# Patient Record
Sex: MALE | Race: WHITE | NOT HISPANIC OR LATINO | Employment: OTHER | URBAN - METROPOLITAN AREA
[De-identification: names, ages, dates, MRNs, and addresses within clinical notes are randomized per-mention and may not be internally consistent; named-entity substitution may affect disease eponyms.]

---

## 2017-05-12 ENCOUNTER — HOSPITAL ENCOUNTER (INPATIENT)
Facility: HOSPITAL | Age: 72
LOS: 1 days | Discharge: HOME | DRG: 086 | End: 2017-05-14
Attending: SURGERY | Admitting: SURGERY
Payer: MEDICARE

## 2017-05-12 ENCOUNTER — APPOINTMENT (EMERGENCY)
Dept: RADIOLOGY | Facility: HOSPITAL | Age: 72
DRG: 086 | End: 2017-05-12
Payer: MEDICARE

## 2017-05-12 DIAGNOSIS — S06.5X9A SUBDURAL HEMATOMA (HCC): Primary | ICD-10-CM

## 2017-05-12 LAB
BASE EXCESS BLDA CALC-SCNC: -5 MMOL/L (ref -2–3)
CA-I BLD-SCNC: 0.75 MMOL/L (ref 1.12–1.32)
GLUCOSE SERPL-MCNC: 145 MG/DL (ref 65–140)
HCO3 BLDA-SCNC: 16.4 MMOL/L (ref 24–30)
HCT VFR BLD CALC: 39 % (ref 36.5–49.3)
HGB BLDA-MCNC: 13.3 G/DL (ref 12–17)
PCO2 BLD: 17 MMOL/L (ref 21–32)
PCO2 BLD: 21.2 MM HG (ref 42–50)
PH BLD: 7.5 [PH] (ref 7.3–7.4)
PO2 BLD: 35 MM HG (ref 35–45)
POTASSIUM BLD-SCNC: 3.9 MMOL/L (ref 3.5–5.3)
SAO2 % BLD FROM PO2: 74 % (ref 95–98)
SODIUM BLD-SCNC: 130 MMOL/L (ref 136–145)
SPECIMEN SOURCE: ABNORMAL

## 2017-05-12 PROCEDURE — 82330 ASSAY OF CALCIUM: CPT

## 2017-05-12 PROCEDURE — 82947 ASSAY GLUCOSE BLOOD QUANT: CPT

## 2017-05-12 PROCEDURE — 70450 CT HEAD/BRAIN W/O DYE: CPT

## 2017-05-12 PROCEDURE — 80320 DRUG SCREEN QUANTALCOHOLS: CPT | Performed by: SURGERY

## 2017-05-12 PROCEDURE — 84132 ASSAY OF SERUM POTASSIUM: CPT

## 2017-05-12 PROCEDURE — 70486 CT MAXILLOFACIAL W/O DYE: CPT

## 2017-05-12 PROCEDURE — 84295 ASSAY OF SERUM SODIUM: CPT

## 2017-05-12 PROCEDURE — 85014 HEMATOCRIT: CPT

## 2017-05-12 PROCEDURE — 82803 BLOOD GASES ANY COMBINATION: CPT

## 2017-05-12 PROCEDURE — 71010 HB CHEST X-RAY 1 VIEW FRONTAL: CPT

## 2017-05-12 PROCEDURE — 72125 CT NECK SPINE W/O DYE: CPT

## 2017-05-12 PROCEDURE — 82075 ASSAY OF BREATH ETHANOL: CPT | Performed by: EMERGENCY MEDICINE

## 2017-05-12 PROCEDURE — 36415 COLL VENOUS BLD VENIPUNCTURE: CPT | Performed by: EMERGENCY MEDICINE

## 2017-05-13 ENCOUNTER — APPOINTMENT (INPATIENT)
Dept: RADIOLOGY | Facility: HOSPITAL | Age: 72
DRG: 086 | End: 2017-05-13
Payer: MEDICARE

## 2017-05-13 PROBLEM — D69.6 THROMBOCYTOPENIA (HCC): Status: ACTIVE | Noted: 2017-05-13

## 2017-05-13 PROBLEM — F10.929 ALCOHOL INTOXICATION (HCC): Status: ACTIVE | Noted: 2017-05-13

## 2017-05-13 PROBLEM — E87.6 HYPOKALEMIA: Status: ACTIVE | Noted: 2017-05-13

## 2017-05-13 PROBLEM — S06.5X9A SUBDURAL HEMATOMA (HCC): Status: ACTIVE | Noted: 2017-05-13

## 2017-05-13 PROBLEM — S09.93XA FACIAL TRAUMA: Status: ACTIVE | Noted: 2017-05-13

## 2017-05-13 LAB
ABO GROUP BLD BPU: NORMAL
ABO GROUP BLD BPU: NORMAL
ABO GROUP BLD: NORMAL
ALBUMIN SERPL BCP-MCNC: 3.3 G/DL (ref 3.5–5)
ALP SERPL-CCNC: 133 U/L (ref 46–116)
ALT SERPL W P-5'-P-CCNC: 55 U/L (ref 12–78)
ANION GAP SERPL CALCULATED.3IONS-SCNC: 15 MMOL/L (ref 4–13)
APTT PPP: 32 SECONDS (ref 23–35)
AST SERPL W P-5'-P-CCNC: 102 U/L (ref 5–45)
BASOPHILS # BLD AUTO: 0.02 THOUSANDS/ΜL (ref 0–0.1)
BASOPHILS # BLD AUTO: 0.02 THOUSANDS/ΜL (ref 0–0.1)
BASOPHILS NFR BLD AUTO: 0 % (ref 0–1)
BASOPHILS NFR BLD AUTO: 0 % (ref 0–1)
BILIRUB DIRECT SERPL-MCNC: 0.6 MG/DL (ref 0–0.2)
BILIRUB SERPL-MCNC: 1.06 MG/DL (ref 0.2–1)
BLD GP AB SCN SERPL QL: NEGATIVE
BPU ID: NORMAL
BPU ID: NORMAL
BUN SERPL-MCNC: 13 MG/DL (ref 5–25)
CALCIUM SERPL-MCNC: 8.5 MG/DL (ref 8.3–10.1)
CHLORIDE SERPL-SCNC: 95 MMOL/L (ref 100–108)
CO2 SERPL-SCNC: 22 MMOL/L (ref 21–32)
CREAT SERPL-MCNC: 1.05 MG/DL (ref 0.6–1.3)
EOSINOPHIL # BLD AUTO: 0 THOUSAND/ΜL (ref 0–0.61)
EOSINOPHIL # BLD AUTO: 0.02 THOUSAND/ΜL (ref 0–0.61)
EOSINOPHIL NFR BLD AUTO: 0 % (ref 0–6)
EOSINOPHIL NFR BLD AUTO: 0 % (ref 0–6)
ERYTHROCYTE [DISTWIDTH] IN BLOOD BY AUTOMATED COUNT: 14.3 % (ref 11.6–15.1)
ERYTHROCYTE [DISTWIDTH] IN BLOOD BY AUTOMATED COUNT: 14.3 % (ref 11.6–15.1)
ETHANOL SERPL-MCNC: 399 MG/DL (ref 0–3)
GFR SERPL CREATININE-BSD FRML MDRD: >60 ML/MIN/1.73SQ M
GLUCOSE SERPL-MCNC: 124 MG/DL (ref 65–140)
HCT VFR BLD AUTO: 30.5 % (ref 36.5–49.3)
HCT VFR BLD AUTO: 35.9 % (ref 36.5–49.3)
HGB BLD-MCNC: 10.5 G/DL (ref 12–17)
HGB BLD-MCNC: 12.3 G/DL (ref 12–17)
INR PPP: 1.38 (ref 0.86–1.16)
LYMPHOCYTES # BLD AUTO: 0.77 THOUSANDS/ΜL (ref 0.6–4.47)
LYMPHOCYTES # BLD AUTO: 0.87 THOUSANDS/ΜL (ref 0.6–4.47)
LYMPHOCYTES NFR BLD AUTO: 13 % (ref 14–44)
LYMPHOCYTES NFR BLD AUTO: 14 % (ref 14–44)
MCH RBC QN AUTO: 30.8 PG (ref 26.8–34.3)
MCH RBC QN AUTO: 30.9 PG (ref 26.8–34.3)
MCHC RBC AUTO-ENTMCNC: 34.3 G/DL (ref 31.4–37.4)
MCHC RBC AUTO-ENTMCNC: 34.4 G/DL (ref 31.4–37.4)
MCV RBC AUTO: 89 FL (ref 82–98)
MCV RBC AUTO: 90 FL (ref 82–98)
MONOCYTES # BLD AUTO: 0.32 THOUSAND/ΜL (ref 0.17–1.22)
MONOCYTES # BLD AUTO: 0.36 THOUSAND/ΜL (ref 0.17–1.22)
MONOCYTES NFR BLD AUTO: 5 % (ref 4–12)
MONOCYTES NFR BLD AUTO: 6 % (ref 4–12)
NEUTROPHILS # BLD AUTO: 4.49 THOUSANDS/ΜL (ref 1.85–7.62)
NEUTROPHILS # BLD AUTO: 5.58 THOUSANDS/ΜL (ref 1.85–7.62)
NEUTS SEG NFR BLD AUTO: 80 % (ref 43–75)
NEUTS SEG NFR BLD AUTO: 82 % (ref 43–75)
NRBC BLD AUTO-RTO: 0 /100 WBCS
NRBC BLD AUTO-RTO: 0 /100 WBCS
PLATELET # BLD AUTO: 18 THOUSANDS/UL (ref 149–390)
PLATELET # BLD AUTO: 46 THOUSANDS/UL (ref 149–390)
PLATELET # BLD AUTO: 61 THOUSANDS/UL (ref 149–390)
PMV BLD AUTO: 9.2 FL (ref 8.9–12.7)
PMV BLD AUTO: 9.5 FL (ref 8.9–12.7)
PMV BLD AUTO: 9.5 FL (ref 8.9–12.7)
POTASSIUM SERPL-SCNC: 3.6 MMOL/L (ref 3.5–5.3)
PROT SERPL-MCNC: 7.4 G/DL (ref 6.4–8.2)
PROTHROMBIN TIME: 17 SECONDS (ref 12.1–14.4)
RBC # BLD AUTO: 3.41 MILLION/UL (ref 3.88–5.62)
RBC # BLD AUTO: 3.98 MILLION/UL (ref 3.88–5.62)
RH BLD: POSITIVE
SODIUM SERPL-SCNC: 132 MMOL/L (ref 136–145)
SPECIMEN EXPIRATION DATE: NORMAL
UNIT DISPENSE STATUS: NORMAL
UNIT DISPENSE STATUS: NORMAL
UNIT PRODUCT CODE: NORMAL
UNIT PRODUCT CODE: NORMAL
UNIT RH: NORMAL
UNIT RH: NORMAL
WBC # BLD AUTO: 5.65 THOUSAND/UL (ref 4.31–10.16)
WBC # BLD AUTO: 6.85 THOUSAND/UL (ref 4.31–10.16)

## 2017-05-13 PROCEDURE — 70450 CT HEAD/BRAIN W/O DYE: CPT

## 2017-05-13 PROCEDURE — 86850 RBC ANTIBODY SCREEN: CPT | Performed by: EMERGENCY MEDICINE

## 2017-05-13 PROCEDURE — 99285 EMERGENCY DEPT VISIT HI MDM: CPT

## 2017-05-13 PROCEDURE — 85025 COMPLETE CBC W/AUTO DIFF WBC: CPT | Performed by: EMERGENCY MEDICINE

## 2017-05-13 PROCEDURE — 86901 BLOOD TYPING SEROLOGIC RH(D): CPT | Performed by: EMERGENCY MEDICINE

## 2017-05-13 PROCEDURE — 85730 THROMBOPLASTIN TIME PARTIAL: CPT | Performed by: EMERGENCY MEDICINE

## 2017-05-13 PROCEDURE — 80048 BASIC METABOLIC PNL TOTAL CA: CPT | Performed by: EMERGENCY MEDICINE

## 2017-05-13 PROCEDURE — P9037 PLATE PHERES LEUKOREDU IRRAD: HCPCS

## 2017-05-13 PROCEDURE — 85049 AUTOMATED PLATELET COUNT: CPT | Performed by: EMERGENCY MEDICINE

## 2017-05-13 PROCEDURE — 85610 PROTHROMBIN TIME: CPT | Performed by: EMERGENCY MEDICINE

## 2017-05-13 PROCEDURE — 86900 BLOOD TYPING SEROLOGIC ABO: CPT | Performed by: EMERGENCY MEDICINE

## 2017-05-13 PROCEDURE — 36415 COLL VENOUS BLD VENIPUNCTURE: CPT | Performed by: EMERGENCY MEDICINE

## 2017-05-13 PROCEDURE — 80076 HEPATIC FUNCTION PANEL: CPT | Performed by: EMERGENCY MEDICINE

## 2017-05-13 PROCEDURE — P9035 PLATELET PHERES LEUKOREDUCED: HCPCS

## 2017-05-13 PROCEDURE — 30233R1 TRANSFUSION OF NONAUTOLOGOUS PLATELETS INTO PERIPHERAL VEIN, PERCUTANEOUS APPROACH: ICD-10-PCS | Performed by: SURGERY

## 2017-05-13 RX ORDER — ACETAMINOPHEN 325 MG/1
975 TABLET ORAL EVERY 6 HOURS PRN
Status: DISCONTINUED | OUTPATIENT
Start: 2017-05-13 | End: 2017-05-14 | Stop reason: HOSPADM

## 2017-05-13 RX ORDER — SODIUM CHLORIDE 9 MG/ML
75 INJECTION, SOLUTION INTRAVENOUS CONTINUOUS
Status: DISCONTINUED | OUTPATIENT
Start: 2017-05-13 | End: 2017-05-14

## 2017-05-13 RX ORDER — OXAZEPAM 10 MG
10 CAPSULE ORAL EVERY 6 HOURS SCHEDULED
Status: DISCONTINUED | OUTPATIENT
Start: 2017-05-13 | End: 2017-05-14 | Stop reason: HOSPADM

## 2017-05-13 RX ORDER — OXYCODONE HYDROCHLORIDE 10 MG/1
10 TABLET ORAL EVERY 4 HOURS PRN
Status: DISCONTINUED | OUTPATIENT
Start: 2017-05-13 | End: 2017-05-14 | Stop reason: HOSPADM

## 2017-05-13 RX ORDER — POTASSIUM CHLORIDE 14.9 MG/ML
20 INJECTION INTRAVENOUS ONCE
Status: COMPLETED | OUTPATIENT
Start: 2017-05-13 | End: 2017-05-13

## 2017-05-13 RX ORDER — LORAZEPAM 2 MG/ML
1 INJECTION INTRAMUSCULAR EVERY 4 HOURS PRN
Status: DISCONTINUED | OUTPATIENT
Start: 2017-05-13 | End: 2017-05-14 | Stop reason: HOSPADM

## 2017-05-13 RX ORDER — OXYCODONE HYDROCHLORIDE 5 MG/1
5 TABLET ORAL EVERY 4 HOURS PRN
Status: DISCONTINUED | OUTPATIENT
Start: 2017-05-13 | End: 2017-05-14 | Stop reason: HOSPADM

## 2017-05-13 RX ADMIN — POTASSIUM CHLORIDE 20 MEQ: 200 INJECTION, SOLUTION INTRAVENOUS at 04:49

## 2017-05-13 RX ADMIN — OXAZEPAM 10 MG: 10 CAPSULE, GELATIN COATED ORAL at 23:53

## 2017-05-13 RX ADMIN — SODIUM CHLORIDE 500 ML: 0.9 INJECTION, SOLUTION INTRAVENOUS at 02:40

## 2017-05-13 RX ADMIN — SODIUM CHLORIDE 75 ML/HR: 0.9 INJECTION, SOLUTION INTRAVENOUS at 15:23

## 2017-05-13 RX ADMIN — LORAZEPAM 1 MG: 2 INJECTION INTRAMUSCULAR; INTRAVENOUS at 20:38

## 2017-05-13 RX ADMIN — POTASSIUM CHLORIDE 20 MEQ: 200 INJECTION, SOLUTION INTRAVENOUS at 02:40

## 2017-05-14 VITALS
WEIGHT: 180 LBS | HEIGHT: 71 IN | TEMPERATURE: 98.7 F | SYSTOLIC BLOOD PRESSURE: 165 MMHG | BODY MASS INDEX: 25.2 KG/M2 | OXYGEN SATURATION: 99 % | HEART RATE: 113 BPM | RESPIRATION RATE: 18 BRPM | DIASTOLIC BLOOD PRESSURE: 80 MMHG

## 2017-05-14 LAB
ANION GAP SERPL CALCULATED.3IONS-SCNC: 9 MMOL/L (ref 4–13)
BASOPHILS # BLD AUTO: 0.02 THOUSANDS/ΜL (ref 0–0.1)
BASOPHILS NFR BLD AUTO: 0 % (ref 0–1)
BUN SERPL-MCNC: 17 MG/DL (ref 5–25)
CALCIUM SERPL-MCNC: 8.2 MG/DL (ref 8.3–10.1)
CHLORIDE SERPL-SCNC: 103 MMOL/L (ref 100–108)
CO2 SERPL-SCNC: 25 MMOL/L (ref 21–32)
CREAT SERPL-MCNC: 1.03 MG/DL (ref 0.6–1.3)
EOSINOPHIL # BLD AUTO: 0.02 THOUSAND/ΜL (ref 0–0.61)
EOSINOPHIL NFR BLD AUTO: 0 % (ref 0–6)
ERYTHROCYTE [DISTWIDTH] IN BLOOD BY AUTOMATED COUNT: 14.5 % (ref 11.6–15.1)
GFR SERPL CREATININE-BSD FRML MDRD: >60 ML/MIN/1.73SQ M
GLUCOSE SERPL-MCNC: 117 MG/DL (ref 65–140)
HCT VFR BLD AUTO: 30.3 % (ref 36.5–49.3)
HGB BLD-MCNC: 10 G/DL (ref 12–17)
LYMPHOCYTES # BLD AUTO: 0.59 THOUSANDS/ΜL (ref 0.6–4.47)
LYMPHOCYTES NFR BLD AUTO: 11 % (ref 14–44)
MCH RBC QN AUTO: 30.5 PG (ref 26.8–34.3)
MCHC RBC AUTO-ENTMCNC: 33 G/DL (ref 31.4–37.4)
MCV RBC AUTO: 92 FL (ref 82–98)
MONOCYTES # BLD AUTO: 0.65 THOUSAND/ΜL (ref 0.17–1.22)
MONOCYTES NFR BLD AUTO: 13 % (ref 4–12)
NEUTROPHILS # BLD AUTO: 3.91 THOUSANDS/ΜL (ref 1.85–7.62)
NEUTS SEG NFR BLD AUTO: 76 % (ref 43–75)
NRBC BLD AUTO-RTO: 0 /100 WBCS
PLATELET # BLD AUTO: 39 THOUSANDS/UL (ref 149–390)
PMV BLD AUTO: 9.9 FL (ref 8.9–12.7)
POTASSIUM SERPL-SCNC: 3.8 MMOL/L (ref 3.5–5.3)
RBC # BLD AUTO: 3.28 MILLION/UL (ref 3.88–5.62)
SODIUM SERPL-SCNC: 137 MMOL/L (ref 136–145)
WBC # BLD AUTO: 5.2 THOUSAND/UL (ref 4.31–10.16)

## 2017-05-14 PROCEDURE — G8988 SELF CARE GOAL STATUS: HCPCS

## 2017-05-14 PROCEDURE — 85025 COMPLETE CBC W/AUTO DIFF WBC: CPT | Performed by: EMERGENCY MEDICINE

## 2017-05-14 PROCEDURE — G8978 MOBILITY CURRENT STATUS: HCPCS

## 2017-05-14 PROCEDURE — 97166 OT EVAL MOD COMPLEX 45 MIN: CPT

## 2017-05-14 PROCEDURE — G8979 MOBILITY GOAL STATUS: HCPCS

## 2017-05-14 PROCEDURE — G8987 SELF CARE CURRENT STATUS: HCPCS

## 2017-05-14 PROCEDURE — 97163 PT EVAL HIGH COMPLEX 45 MIN: CPT

## 2017-05-14 PROCEDURE — 80048 BASIC METABOLIC PNL TOTAL CA: CPT | Performed by: EMERGENCY MEDICINE

## 2017-05-14 RX ORDER — ACETAMINOPHEN 325 MG/1
650 TABLET ORAL EVERY 6 HOURS PRN
Qty: 30 TABLET | Refills: 0 | Status: SHIPPED | OUTPATIENT
Start: 2017-05-14

## 2017-05-14 RX ADMIN — OXAZEPAM 10 MG: 10 CAPSULE, GELATIN COATED ORAL at 05:18

## 2017-05-14 RX ADMIN — SODIUM CHLORIDE 75 ML/HR: 0.9 INJECTION, SOLUTION INTRAVENOUS at 03:19

## 2017-05-14 RX ADMIN — OXAZEPAM 10 MG: 10 CAPSULE, GELATIN COATED ORAL at 11:24

## 2019-04-19 LAB — HBA1C MFR BLD HPLC: 5.6 %

## 2019-04-26 LAB — HBA1C MFR BLD HPLC: 5.8 %

## 2019-07-10 ENCOUNTER — APPOINTMENT (OUTPATIENT)
Dept: LAB | Facility: CLINIC | Age: 74
End: 2019-07-10
Payer: MEDICARE

## 2019-07-10 ENCOUNTER — CONSULT (OUTPATIENT)
Dept: SURGERY | Facility: CLINIC | Age: 74
End: 2019-07-10
Payer: MEDICARE

## 2019-07-10 VITALS
BODY MASS INDEX: 25.11 KG/M2 | HEART RATE: 79 BPM | SYSTOLIC BLOOD PRESSURE: 134 MMHG | HEIGHT: 70 IN | TEMPERATURE: 98.2 F | WEIGHT: 175.4 LBS | DIASTOLIC BLOOD PRESSURE: 62 MMHG

## 2019-07-10 DIAGNOSIS — K70.31 ALCOHOLIC CIRRHOSIS OF LIVER WITH ASCITES (HCC): Primary | ICD-10-CM

## 2019-07-10 DIAGNOSIS — K70.31 ALCOHOLIC CIRRHOSIS OF LIVER WITH ASCITES (HCC): ICD-10-CM

## 2019-07-10 LAB
ALBUMIN SERPL BCP-MCNC: 3.3 G/DL (ref 3.5–5)
ALP SERPL-CCNC: 242 U/L (ref 46–116)
ALT SERPL W P-5'-P-CCNC: 26 U/L (ref 12–78)
ANION GAP SERPL CALCULATED.3IONS-SCNC: 11 MMOL/L (ref 4–13)
AST SERPL W P-5'-P-CCNC: 34 U/L (ref 5–45)
BASOPHILS # BLD AUTO: 0.02 THOUSANDS/ΜL (ref 0–0.1)
BASOPHILS NFR BLD AUTO: 0 % (ref 0–1)
BILIRUB SERPL-MCNC: 0.68 MG/DL (ref 0.2–1)
BUN SERPL-MCNC: 30 MG/DL (ref 5–25)
CALCIUM SERPL-MCNC: 8.8 MG/DL (ref 8.3–10.1)
CHLORIDE SERPL-SCNC: 107 MMOL/L (ref 100–108)
CO2 SERPL-SCNC: 21 MMOL/L (ref 21–32)
CREAT SERPL-MCNC: 2.78 MG/DL (ref 0.6–1.3)
EOSINOPHIL # BLD AUTO: 0.08 THOUSAND/ΜL (ref 0–0.61)
EOSINOPHIL NFR BLD AUTO: 2 % (ref 0–6)
ERYTHROCYTE [DISTWIDTH] IN BLOOD BY AUTOMATED COUNT: 16.1 % (ref 11.6–15.1)
GFR SERPL CREATININE-BSD FRML MDRD: 22 ML/MIN/1.73SQ M
GLUCOSE P FAST SERPL-MCNC: 114 MG/DL (ref 65–99)
HCT VFR BLD AUTO: 31.8 % (ref 36.5–49.3)
HGB BLD-MCNC: 10 G/DL (ref 12–17)
IMM GRANULOCYTES # BLD AUTO: 0.01 THOUSAND/UL (ref 0–0.2)
IMM GRANULOCYTES NFR BLD AUTO: 0 % (ref 0–2)
LYMPHOCYTES # BLD AUTO: 0.64 THOUSANDS/ΜL (ref 0.6–4.47)
LYMPHOCYTES NFR BLD AUTO: 12 % (ref 14–44)
MCH RBC QN AUTO: 25.8 PG (ref 26.8–34.3)
MCHC RBC AUTO-ENTMCNC: 31.4 G/DL (ref 31.4–37.4)
MCV RBC AUTO: 82 FL (ref 82–98)
MONOCYTES # BLD AUTO: 0.38 THOUSAND/ΜL (ref 0.17–1.22)
MONOCYTES NFR BLD AUTO: 7 % (ref 4–12)
NEUTROPHILS # BLD AUTO: 4.14 THOUSANDS/ΜL (ref 1.85–7.62)
NEUTS SEG NFR BLD AUTO: 79 % (ref 43–75)
NRBC BLD AUTO-RTO: 0 /100 WBCS
PLATELET # BLD AUTO: 107 THOUSANDS/UL (ref 149–390)
PMV BLD AUTO: 10.8 FL (ref 8.9–12.7)
POTASSIUM SERPL-SCNC: 3.5 MMOL/L (ref 3.5–5.3)
PROT SERPL-MCNC: 7.4 G/DL (ref 6.4–8.2)
RBC # BLD AUTO: 3.87 MILLION/UL (ref 3.88–5.62)
SODIUM SERPL-SCNC: 139 MMOL/L (ref 136–145)
WBC # BLD AUTO: 5.27 THOUSAND/UL (ref 4.31–10.16)

## 2019-07-10 PROCEDURE — 99202 OFFICE O/P NEW SF 15 MIN: CPT | Performed by: SPECIALIST

## 2019-07-10 PROCEDURE — 36415 COLL VENOUS BLD VENIPUNCTURE: CPT

## 2019-07-10 PROCEDURE — 80053 COMPREHEN METABOLIC PANEL: CPT

## 2019-07-10 PROCEDURE — 85025 COMPLETE CBC W/AUTO DIFF WBC: CPT

## 2019-07-10 RX ORDER — PANTOPRAZOLE SODIUM 40 MG/1
40 TABLET, DELAYED RELEASE ORAL DAILY
Refills: 3 | COMMUNITY
Start: 2019-05-22

## 2019-07-10 RX ORDER — FOLIC ACID 1 MG/1
1000 TABLET ORAL DAILY
Refills: 0 | COMMUNITY
Start: 2019-04-27

## 2019-07-10 RX ORDER — DEXAMETHASONE 1.5 MG/1
TABLET ORAL
Refills: 0 | COMMUNITY
Start: 2019-04-15

## 2019-07-10 RX ORDER — IRON POLYSACCHARIDE COMPLEX 150 MG
150 CAPSULE ORAL 2 TIMES DAILY
COMMUNITY

## 2019-07-10 RX ORDER — TORSEMIDE 5 MG/1
5 TABLET ORAL DAILY
COMMUNITY

## 2019-07-10 RX ORDER — ACYCLOVIR 400 MG/1
400 TABLET ORAL 3 TIMES DAILY
Refills: 1 | COMMUNITY
Start: 2019-04-15

## 2019-07-10 RX ORDER — LANOLIN ALCOHOL/MO/W.PET/CERES
CREAM (GRAM) TOPICAL DAILY
COMMUNITY

## 2019-07-10 RX ORDER — FENOPROFEN CALCIUM 600 MG
TABLET ORAL
Refills: 1 | COMMUNITY
Start: 2019-06-05

## 2019-07-10 RX ORDER — AMLODIPINE BESYLATE 10 MG/1
10 TABLET ORAL DAILY
Refills: 3 | COMMUNITY
Start: 2019-05-22

## 2019-07-10 NOTE — PROGRESS NOTES
Assessment/Plan:    Alcoholic cirrhosis of liver with ascites [facility])  The patient is a 53-year-old white male with an 8 month history of ascites who has been receiving large volume paracentesis  The patient reports that his primary care physician had ordered a hepatitis panel, although those results are not available to me, and that he has had an abdominal ultrasound, CT scan, and an MRI and that he carries a diagnosis of alcoholic cirrhosis  He also has a diagnosis of anemia, but has never had a screening colonoscopy  The patient has a long alcohol history having abstain from alcohol for the last 7 years  He also has a history of exposure to various chemicals over the years working in the [employer]  He states that he recently became symptomatic with ascites after an episode of shingles  Prior to that he had no symptoms of abdominal distention  He currently is receiving a large volume paracentesis with [facility] in [place], [place], and is aware of his poor prognosis and seeks an evaluation for liver transplantion  I have made contact with the [facility] [address] at the [address] [place] [address], and will refer him for evaluation  Diagnoses and all orders for this visit:    Alcoholic cirrhosis of liver with ascites ([facility] [address] )  -     Comprehensive metabolic panel; Future  -     CBC and differential; Future    Other orders  -     pantoprazole (PROTONIX) 40 mg tablet; Take 40 mg by mouth daily  -     folic acid (FOLVITE) 1 mg tablet; Take 1,000 mcg by mouth daily  -     fenoprofen (NALFON) 600 MG; TAKE ONE TABLET EVERY 12 HOURS WITH FOOD FOR ACHES AND PAINS  -     TAPERDEX 6-DAY 1 5 MG (21) TBPK; Use as directed  -     amLODIPine (NORVASC) 10 mg tablet; Take 10 mg by mouth daily  -     acyclovir (ZOVIRAX) 400 MG tablet; Take 400 mg by mouth 3 (three) times a day  -     torsemide (DEMADEX) 5 MG tablet;  Take 5 mg by mouth daily  -     iron polysaccharides (FERREX 150) 150 mg capsule; Take 150 mg by mouth 2 (two) times a day  -     cyanocobalamin (VITAMIN B-12) 1,000 mcg tablet; Take by mouth daily          Subjective:      Patient ID: Angie Cordero is a 69 y o  male  The patient is a 82-year-old white male with an 8 month history of ascites  He has undergone paracentesis with benign cytology  He states he has had workup with multiple imaging studies and carries a diagnosis of alcoholic cirrhosis  The following portions of the patient's history were reviewed and updated as appropriate: allergies, current medications, past family history, past medical history, past social history, past surgical history and problem list     Review of Systems   Constitutional: Positive for fatigue and unexpected weight change (With Muscle Wasting)  Negative for chills and fever  HENT: Negative for sore throat and trouble swallowing  Respiratory: Negative for chest tightness  Shortness of breath: Dyspnea with mild exertion  Cardiovascular: Positive for leg swelling  Negative for chest pain and palpitations  Gastrointestinal: Positive for abdominal distention  Negative for abdominal pain, anal bleeding, blood in stool, constipation, diarrhea, nausea, rectal pain and vomiting  Endocrine: Positive for polydipsia  Genitourinary: Negative for difficulty urinating, dysuria, frequency and hematuria  Musculoskeletal: Arthralgias: Chronic Left Knee Pain  Skin: Color change: Denies symptoms of Jaundice  Neurological: Negative for light-headedness and headaches  Weakness: generalized weakness  Numbness: Paresthesia of fingertips  Psychiatric/Behavioral: Negative for agitation, behavioral problems and confusion           Objective:      /62 (BP Location: Left arm, Patient Position: Sitting, Cuff Size: Adult)   Pulse 79   Temp 98 2 °F (36 8 °C) (Tympanic)   Ht 5' 10" (1 778 m)   Wt 79 6 kg (175 lb 6 4 oz)   BMI 25 17 kg/m² Physical Exam   Constitutional: He is oriented to person, place, and time  Thin white male, moves cautiously, in no acute distress   HENT:   Head: Normocephalic and atraumatic  Eyes: Pupils are equal, round, and reactive to light  No scleral icterus  Neck: Neck supple  Without carotid bruit, no appreciated thyromegaly   Cardiovascular: Normal rate, regular rhythm and normal heart sounds  No murmur heard  Pulmonary/Chest: Effort normal and breath sounds normal  No respiratory distress  He has no wheezes  He has no rales  Abdominal: Soft  Bowel sounds are normal  He exhibits no mass  Distention: Protuberant with flank dullness  There is no tenderness  No organomegaly   Genitourinary:   Genitourinary Comments: Deferred   Musculoskeletal: He exhibits edema (Bilateral pitting edema)  He exhibits no deformity  Lymphadenopathy:     He has no cervical adenopathy  Neurological: He is alert and oriented to person, place, and time  Skin: Skin is warm and dry  Bilateral upper extremity tattoos   Psychiatric: He has a normal mood and affect   His behavior is normal

## 2019-07-10 NOTE — ASSESSMENT & PLAN NOTE
The patient is a 72-year-old white male with an 8 month history of ascites who has been receiving large volume paracentesis  The patient reports that his primary care physician had ordered a hepatitis panel, although those results are not available to me, and that he has had an abdominal ultrasound, CT scan, and an MRI and that he carries a diagnosis of alcoholic cirrhosis  He also has a diagnosis of anemia, but has never had a screening colonoscopy  The patient has a long alcohol history having abstain from alcohol for the last 7 years  He also has a history of exposure to various chemicals over the years working in the [employer]  He states that he recently became symptomatic with ascites after an episode of shingles  Prior to that he had no symptoms of abdominal distention  He currently is receiving a large volume paracentesis with albumin infusion at [facility] in [place], [place], and is aware of his poor prognosis and seeks an evaluation for liver transplantion  I have made contact with the [facility] [address] at the [address] [place] [address], and will refer him for evaluation

## 2019-07-16 ENCOUNTER — TELEPHONE (OUTPATIENT)
Dept: SURGERY | Facility: CLINIC | Age: 74
End: 2019-07-16

## 2019-07-19 ENCOUNTER — TELEPHONE (OUTPATIENT)
Dept: SURGERY | Facility: CLINIC | Age: 74
End: 2019-07-19